# Patient Record
(demographics unavailable — no encounter records)

---

## 2025-06-11 NOTE — PHYSICAL EXAM
[de-identified] : PLEASE SEE HPI FOR ADDITIONAL RELEVANT PHYSICAL EXAM FINDINGS GENERAL: mild distress, nontoxic.  Appears uncomfortable HEAD: normocephalic EYES: no scleral icterus NECK: trachea midline, Full ROM/supple RESP: no respiratory distress, no obvious wheeze or stridor, no accessory muscle use noted ABD: nondistended.  +Epigastric/RUQ/LUQ TTP with self-palpation.   MSK: no gross deformity NEURO: alert & fully oriented SKIN: no rash PSYCH: cooperative, good insight, appropriate, fluent speech

## 2025-06-11 NOTE — HISTORY OF PRESENT ILLNESS
[Home] : at home, [unfilled] , at the time of the visit. [Other Location: e.g. Home (Enter Location, City,State)___] : at [unfilled] [Telehealth (audio & video)] : This visit was provided via telehealth using real-time 2-way audio visual technology. [Verbal consent obtained from patient] : the patient, [unfilled] [FreeTextEntry8] : 42 y F N/V/D x 5 days Unable to tolerate PO.  +Severe abd pain epigastric, RUQ, LUQ, L flank.  Worsening, unable to tolerate PO +subjective fever and night sweats +CP yesterday and today No urinary symptoms Takes protonix and stool softener S/P cholecystectomy 2024

## 2025-06-11 NOTE — ASSESSMENT
[FreeTextEntry1] : Epigastric abd pain, severe, worsening, a/w NVD and CP, some SOB Broad ddx Requires ED eval